# Patient Record
Sex: FEMALE | Race: WHITE | NOT HISPANIC OR LATINO | Employment: OTHER | URBAN - METROPOLITAN AREA
[De-identification: names, ages, dates, MRNs, and addresses within clinical notes are randomized per-mention and may not be internally consistent; named-entity substitution may affect disease eponyms.]

---

## 2017-12-07 ENCOUNTER — OFFICE VISIT (OUTPATIENT)
Dept: PULMONOLOGY | Facility: CLINIC | Age: 47
End: 2017-12-07

## 2017-12-07 VITALS
DIASTOLIC BLOOD PRESSURE: 60 MMHG | HEART RATE: 112 BPM | WEIGHT: 293 LBS | BODY MASS INDEX: 57.52 KG/M2 | HEIGHT: 60 IN | TEMPERATURE: 97.8 F | OXYGEN SATURATION: 88 % | RESPIRATION RATE: 16 BRPM | SYSTOLIC BLOOD PRESSURE: 108 MMHG

## 2017-12-07 DIAGNOSIS — E66.01 MORBID OBESITY WITH BMI OF 60.0-69.9, ADULT (HCC): ICD-10-CM

## 2017-12-07 DIAGNOSIS — J45.909 PERSISTENT ASTHMA WITHOUT COMPLICATION, UNSPECIFIED ASTHMA SEVERITY: Primary | ICD-10-CM

## 2017-12-07 DIAGNOSIS — G47.33 OSA (OBSTRUCTIVE SLEEP APNEA): ICD-10-CM

## 2017-12-07 DIAGNOSIS — J44.9 CHRONIC OBSTRUCTIVE PULMONARY DISEASE, UNSPECIFIED COPD TYPE (HCC): ICD-10-CM

## 2017-12-07 PROCEDURE — 90686 IIV4 VACC NO PRSV 0.5 ML IM: CPT | Performed by: INTERNAL MEDICINE

## 2017-12-07 PROCEDURE — 99214 OFFICE O/P EST MOD 30 MIN: CPT | Performed by: INTERNAL MEDICINE

## 2017-12-07 PROCEDURE — G0008 ADMIN INFLUENZA VIRUS VAC: HCPCS | Performed by: INTERNAL MEDICINE

## 2017-12-07 RX ORDER — MONTELUKAST SODIUM 10 MG/1
10 TABLET ORAL DAILY
Qty: 30 TABLET | Refills: 11 | Status: SHIPPED | OUTPATIENT
Start: 2017-12-07

## 2017-12-07 RX ORDER — BUDESONIDE AND FORMOTEROL FUMARATE DIHYDRATE 160; 4.5 UG/1; UG/1
2 AEROSOL RESPIRATORY (INHALATION)
Qty: 1 INHALER | Refills: 12 | Status: SHIPPED | OUTPATIENT
Start: 2017-12-07

## 2017-12-07 RX ORDER — ALBUTEROL SULFATE 90 UG/1
2 AEROSOL, METERED RESPIRATORY (INHALATION) EVERY 6 HOURS PRN
Qty: 1 INHALER | Refills: 11 | Status: SHIPPED | OUTPATIENT
Start: 2017-12-07

## 2017-12-07 RX ORDER — DULAGLUTIDE 1.5 MG/.5ML
INJECTION, SOLUTION SUBCUTANEOUS
Refills: 0 | COMMUNITY
Start: 2017-12-04

## 2017-12-07 RX ORDER — INSULIN ASPART 100 [IU]/ML
INJECTION, SUSPENSION SUBCUTANEOUS
Refills: 1 | COMMUNITY
Start: 2017-11-08

## 2017-12-07 NOTE — PATIENT INSTRUCTIONS
Asthma, Adult  Asthma is a recurring condition in which the airways tighten and narrow. Asthma can make it difficult to breathe. It can cause coughing, wheezing, and shortness of breath. Asthma episodes, also called asthma attacks, range from minor to life-threatening. Asthma cannot be cured, but medicines and lifestyle changes can help control it.  CAUSES  Asthma is believed to be caused by inherited (genetic) and environmental factors, but its exact cause is unknown. Asthma may be triggered by allergens, lung infections, or irritants in the air. Asthma triggers are different for each person. Common triggers include:   · Animal dander.  · Dust mites.  · Cockroaches.  · Pollen from trees or grass.  · Mold.  · Smoke.  · Air pollutants such as dust, household , hair sprays, aerosol sprays, paint fumes, strong chemicals, or strong odors.  · Cold air, weather changes, and winds (which increase molds and pollens in the air).  · Strong emotional expressions such as crying or laughing hard.  · Stress.  · Certain medicines (such as aspirin) or types of drugs (such as beta-blockers).  · Sulfites in foods and drinks. Foods and drinks that may contain sulfites include dried fruit, potato chips, and sparkling grape juice.  · Infections or inflammatory conditions such as the flu, a cold, or an inflammation of the nasal membranes (rhinitis).  · Gastroesophageal reflux disease (GERD).  · Exercise or strenuous activity.  SYMPTOMS  Symptoms may occur immediately after asthma is triggered or many hours later. Symptoms include:  · Wheezing.  · Excessive nighttime or early morning coughing.  · Frequent or severe coughing with a common cold.  · Chest tightness.  · Shortness of breath.  DIAGNOSIS   The diagnosis of asthma is made by a review of your medical history and a physical exam. Tests may also be performed. These may include:  · Lung function studies. These tests show how much air you breathe in and out.  · Allergy  tests.  · Imaging tests such as X-rays.  TREATMENT   Asthma cannot be cured, but it can usually be controlled. Treatment involves identifying and avoiding your asthma triggers. It also involves medicines. There are 2 classes of medicine used for asthma treatment:   · Controller medicines. These prevent asthma symptoms from occurring. They are usually taken every day.  · Reliever or rescue medicines. These quickly relieve asthma symptoms. They are used as needed and provide short-term relief.  Your health care provider will help you create an asthma action plan. An asthma action plan is a written plan for managing and treating your asthma attacks. It includes a list of your asthma triggers and how they may be avoided. It also includes information on when medicines should be taken and when their dosage should be changed. An action plan may also involve the use of a device called a peak flow meter. A peak flow meter measures how well the lungs are working. It helps you monitor your condition.  HOME CARE INSTRUCTIONS   · Take medicines only as directed by your health care provider. Speak with your health care provider if you have questions about how or when to take the medicines.  · Use a peak flow meter as directed by your health care provider. Record and keep track of readings.  · Understand and use the action plan to help minimize or stop an asthma attack without needing to seek medical care.  · Control your home environment in the following ways to help prevent asthma attacks:    Do not smoke. Avoid being exposed to secondhand smoke.    Change your heating and air conditioning filter regularly.    Limit your use of fireplaces and wood stoves.    Get rid of pests (such as roaches and mice) and their droppings.    Throw away plants if you see mold on them.    Clean your floors and dust regularly. Use unscented cleaning products.    Try to have someone else vacuum for you regularly. Stay out of rooms while they are  being vacuumed and for a short while afterward. If you vacuum, use a dust mask from a hardware store, a double-layered or microfilter vacuum  bag, or a vacuum  with a HEPA filter.    Replace carpet with wood, tile, or vinyl renee. Carpet can trap dander and dust.    Use allergy-proof pillows, mattress covers, and box spring covers.    Wash bed sheets and blankets every week in hot water and dry them in a dryer.    Use blankets that are made of polyester or cotton.    Clean bathrooms and david with bleach. If possible, have someone repaint the walls in these rooms with mold-resistant paint. Keep out of the rooms that are being cleaned and painted.    Wash hands frequently.  SEEK MEDICAL CARE IF:   · You have wheezing, shortness of breath, or a cough even if taking medicine to prevent attacks.  · The colored mucus you cough up (sputum) is thicker than usual.  · Your sputum changes from clear or white to yellow, green, gray, or bloody.  · You have any problems that may be related to the medicines you are taking (such as a rash, itching, swelling, or trouble breathing).  · You are using a reliever medicine more than 2-3 times per week.  · Your peak flow is still at 50-79% of your personal best after following your action plan for 1 hour.  · You have a fever.  SEEK IMMEDIATE MEDICAL CARE IF:   · You seem to be getting worse and are unresponsive to treatment during an asthma attack.  · You are short of breath even at rest.  · You get short of breath when doing very little physical activity.  · You have difficulty eating, drinking, or talking due to asthma symptoms.  · You develop chest pain.  · You develop a fast heartbeat.  · You have a bluish color to your lips or fingernails.  · You are light-headed, dizzy, or faint.  · Your peak flow is less than 50% of your personal best.     This information is not intended to replace advice given to you by your health care provider. Make sure you discuss any  questions you have with your health care provider.     Document Released: 12/18/2006 Document Revised: 09/07/2016 Document Reviewed: 07/17/2014  [employer]vier Interactive Patient Education ©2017 [employer]

## 2017-12-07 NOTE — PROGRESS NOTES
Subjective   Cristina MOURA is a 47 y.o. female is here today for follow-up.She is followed here with asthma.  Her primary care physician is Dr. Sheffield.    History of Present Illness  She was last seen 10/27/16. She has severe chronic asthma , morbid obesity with obesity hypoventilation syndrome with a history of tobacco abuse.  She states that her breathing is about the same and she has been hospitalized one to two times at [facility] [place].  She is using her biPAP and O2 at night. She is on Incruse, Advair, Albuterol and Singulair.  She also has a history of diabetes, hypertension , Chronic Back Pain and lymphadema  Past Medical History:   Diagnosis Date   • Allergic rhinitis    • Anemia    • Arthritis    • Asthma    • Bipolar 1 disorder    • Depression    • Diabetes mellitus     TYPE II   • Dyspnea    • GERD (gastroesophageal reflux disease)    • History of chest x-ray 09/17/2015    PA and lateral.Obtained of good quality.Moderate degenerative changes thoracic spine.Cardiac silhouette was slightly above upper limits of normal.Patient seemd to have some increased interstitial markings bilaterally w/ a few scattered calcified granulomas.May need to consider CT scanning to further eval for interstitial disease.   • History of chest x-ray 02/18/2015    Old head granulomatous disease seen w/in chest w/ no radiographic evidence of acute parnechymal disease.   • History of chest x-ray 10/19/2014    Suboptimal inspiration.Again noted is an enlarged cardiac silhouette.Bibasal likely atelectacic change.Scattered nodular opacities again noted.   • History of echocardiogram 07/28/2015    Reports an estimated ejection fraction btw 55-60%, trace MR, TR, MS, and calculated RVSP is not mentioned.Normal LT VT systolic function   • History of PFTs 09/17/2015    Good effort.Suggested mild restriction,? less than maximal effort.Evidence of small airway obstruction.TLC in normal limits.Diffusion capacity moderately decreased in absolute value  but in normal limits and corrected for alveolar volume.IVC less than 85% of VC.BMI is 62.1.Acceptable and reproducible.   • History of pneumonia    • Hyperlipidemia    • Hypertension    • Lower extremity cellulitis     Bilateral, July 2015.   • Lower extremity edema    • Migraines    • Neuromuscular disorder    • Other specified persistent mood disorders    • PTSD (post-traumatic stress disorder)    • Pulmonary embolism     Post total knee replacement, 3/2/2015.B.  CT scan of the chest 3/20/2015, reports  small bilateral lower lobe proximal segmental artery pulmonary emboli.Chest x-ray showed some slight increased markings bilaterally. Chest CT scan from earlier this year showed no significant parenchymal lesions    • Sleep apnea        Past Surgical History:   Procedure Laterality Date   • CARPAL TUNNEL RELEASE     • CHOLECYSTECTOMY     • COLONOSCOPY W/ POLYPECTOMY  06/20/2008    History of Complete Colonoscopy For Polyp Removal  Status post colonoscopy 6/20/2008, reported grade 3 hemorrhoids.   • HAND SURGERY      CYST REMOVED   • HEMORRHOIDECTOMY     • MASS EXCISION  02/27/2007    History of Repair Of Humerus / Arm.  Status post excision of left forearm mass 2/27/2007, pathology revealed no malignancy   • NEUROPLASTY      History of Neuroplasty Median Nerve At Carpal Tunnel   • REPLACEMENT TOTAL KNEE  03/02/2015    REPLACEMENT. Total knee replacement   • TONSILLECTOMY     • TUBAL ABDOMINAL LIGATION             Current Outpatient Prescriptions:   •  Albuterol Sulfate (PROAIR HFA IN), Inhale 2 (Two) Times a Day., Disp: , Rfl:   •  busPIRone (BUSPAR) 10 MG tablet, Take  by mouth 2 (Two) Times a Day., Disp: , Rfl:   •  ciprofloxacin (CIPRO) 500 MG tablet, Take  by mouth Daily., Disp: , Rfl:   •  Esomeprazole Magnesium (NEXIUM PO), Take  by mouth Daily., Disp: , Rfl:   •  fexofenadine (ALLEGRA) 180 MG tablet, Take  by mouth Daily., Disp: , Rfl:   •  fluticasone (FLONASE) 50 MCG/ACT nasal spray, into each nostril  Daily., Disp: , Rfl:   •  fluticasone-salmeterol (ADVAIR HFA) 230-21 MCG/ACT inhaler, Inhale 2 puffs 2 (Two) Times a Day., Disp: 1 inhaler, Rfl: 11  •  gabapentin (NEURONTIN) 800 MG tablet, Take  by mouth 4 (Four) Times a Day., Disp: , Rfl:   •  glipiZIDE (GLUCOTROL) 10 MG tablet, Take  by mouth 2 (Two) Times a Day., Disp: , Rfl:   •  Insulin Glargine (TOUJEO SOLOSTAR SC), Inject 65 Units under the skin Every Night., Disp: , Rfl:   •  Liraglutide (VICTOZA SC), Inject 1.8 Units under the skin Daily., Disp: , Rfl:   •  metoprolol succinate XL (TOPROL-XL) 25 MG 24 hr tablet, Take  by mouth Daily., Disp: , Rfl:   •  montelukast (SINGULAIR) 10 MG tablet, Take  by mouth Daily., Disp: , Rfl:   •  Naphazoline-Pheniramine (OPCON-A) 0.027-0.315 % solution, Apply  to eye As Needed., Disp: , Rfl:   •  OLANZapine (ZYPREXA) 5 MG tablet, Take  by mouth Daily., Disp: , Rfl:   •  oxyCODONE-acetaminophen (PERCOCET) 5-325 MG per tablet, Take  by mouth 3 (Three) Times a Day., Disp: , Rfl:   •  potassium chloride (K-DUR,KLOR-CON) 20 MEQ CR tablet, Take  by mouth Daily., Disp: , Rfl:   •  SUMAtriptan (IMITREX) 100 MG tablet, Take  by mouth As Needed., Disp: , Rfl:   •  topiramate (TOPAMAX) 50 MG tablet, Take 3 tablets by mouth Every Night., Disp: , Rfl:   •  Umeclidinium Bromide 62.5 MCG/INH aerosol powder , Inhale 62.5 mcg Daily., Disp: 1 each, Rfl: 11  •  venlafaxine XR (EFFEXOR-XR) 75 MG 24 hr capsule, Take  by mouth Daily., Disp: , Rfl:     Allergies   Allergen Reactions   • Adhesive Tape    • Augmentin [Amoxicillin-Pot Clavulanate]      Augmentin ES-600 SUSR   • Clindamycin/Lincomycin    • Codeine      Codeine sulfate TABS   • Flexeril [Cyclobenzaprine]    • Ketorolac    • Lithium      Lithium Carbonate TABS   • Metformin And Related      Metformin HCl TABS   • Morphine And Related      Morphine Sulfate TABS   • Nicoderm [Nicotine]    • Paxil [Paroxetine Hcl]      CR TB24   • Slo-Bid Gyrocaps [Theophylline]    • Sudafed  [Pseudoephedrine]      Sudafed 12 Hour TB12    • Sulfa Antibiotics      Sulfa Drugs   • Toradol [Ketorolac Tromethamine]      Toradol SOLN   • Wellbutrin [Bupropion]        The following portions of the patient's history were reviewed and updated as appropriate: allergies, current medications and problem list.    Review of Systems   Constitutional: Negative.    HENT: Positive for congestion, postnasal drip and sinus pressure.    Eyes: Positive for itching.   Respiratory: Positive for cough, shortness of breath and wheezing.    Cardiovascular: Positive for leg swelling.   Gastrointestinal: Positive for constipation.   Endocrine: Positive for polydipsia.   Genitourinary: Negative.    Musculoskeletal: Positive for arthralgias, back pain and joint swelling.   Skin: Negative.    Allergic/Immunologic: Positive for environmental allergies.   Neurological: Positive for headaches.   Hematological: Negative.    Psychiatric/Behavioral: Positive for dysphoric mood. The patient is nervous/anxious.        Objective     Blood pressures 108/60 pulse 112 respirations 16 temperature 97.8 O2 saturates 88% on room air body mass index 71.7    Physical Exam   Constitutional: She is oriented to person, place, and time. She appears well-developed and well-nourished.   She is morbidly obese.   HENT:   Head: Normocephalic and atraumatic.   She has nasal airway narrowing and Mallampati Class IV anatomy.   Eyes: EOM are normal. Pupils are equal, round, and reactive to light.   Neck: Normal range of motion. Neck supple.   Cardiovascular: Normal rate and regular rhythm.    Pulmonary/Chest: Effort normal. She has wheezes.   She has a few expiratory wheezes.   Abdominal: Soft. Bowel sounds are normal.   Musculoskeletal: Normal range of motion. She exhibits edema.   She has 3+ edema.   Neurological: She is alert and oriented to person, place, and time.   Skin: Skin is warm and dry.   Psychiatric: She has a normal mood and affect. Her behavior is  normal.         Assessment/Plan    Chronic Asthma  Morbid Obesity  Diabetes  Obesity Hypoventilation syndrome  Hypertension  The patient seems to be doing fair with her current regimen. She states that she has adequate supplies of her medications.  She is to continue her BiPap and O2.  She is to receive a flu shot today.  She is to return in 6 months.  She is to contact us earlier if symptoms worsen.             Nithin Swenson MD [facility]  Sleep Medicine  Pulmonary and Critical Care Medicine      12/07/17  1:04 PM